# Patient Record
Sex: MALE | Race: WHITE | URBAN - METROPOLITAN AREA
[De-identification: names, ages, dates, MRNs, and addresses within clinical notes are randomized per-mention and may not be internally consistent; named-entity substitution may affect disease eponyms.]

---

## 2024-11-13 ENCOUNTER — TELEPHONE (OUTPATIENT)
Age: 59
End: 2024-11-13

## 2024-11-13 NOTE — TELEPHONE ENCOUNTER
Received call from Patient for a New Patient appt for Skin check, scalp, right big toe.     Offered first avail 6/2025 [place]. Patient rejected as it is too far out. Informed patient they can call back to check for cancellations/sooner appt.     Patient verbalized they will call back to check for cancellations/sooner appt.